# Patient Record
Sex: FEMALE | Race: BLACK OR AFRICAN AMERICAN | Employment: UNEMPLOYED | URBAN - METROPOLITAN AREA
[De-identification: names, ages, dates, MRNs, and addresses within clinical notes are randomized per-mention and may not be internally consistent; named-entity substitution may affect disease eponyms.]

---

## 2019-11-15 ENCOUNTER — HOSPITAL ENCOUNTER (OUTPATIENT)
Age: 29
Discharge: HOME | End: 2019-11-17

## 2019-11-15 PROCEDURE — 86762 RUBELLA ANTIBODY: CPT

## 2019-11-15 PROCEDURE — 86765 RUBEOLA ANTIBODY: CPT

## 2019-11-15 PROCEDURE — 86735 MUMPS ANTIBODY: CPT

## 2019-11-19 LAB
MEASLES IMMUNE (IGG): NORMAL
MUMPS AB IGG: NORMAL
RUBELLA ANTIBODY IGG: NORMAL

## 2020-08-31 ENCOUNTER — HOSPITAL ENCOUNTER (EMERGENCY)
Age: 30
Discharge: HOME | End: 2020-08-31
Attending: EMERGENCY MEDICINE
Payer: MEDICARE

## 2020-08-31 VITALS
HEART RATE: 70 BPM | BODY MASS INDEX: 28.43 KG/M2 | TEMPERATURE: 97.2 F | OXYGEN SATURATION: 98 % | DIASTOLIC BLOOD PRESSURE: 74 MMHG | RESPIRATION RATE: 17 BRPM | SYSTOLIC BLOOD PRESSURE: 122 MMHG | WEIGHT: 187 LBS

## 2020-08-31 LAB
ANION GAP SERPL CALCULATED.3IONS-SCNC: 12 MMOL/L (ref 7–16)
ANISOCYTOSIS: ABNORMAL
BACTERIA: ABNORMAL /HPF
BASOPHILS ABSOLUTE: 0.02 E9/L (ref 0–0.2)
BASOPHILS RELATIVE PERCENT: 0.6 % (ref 0–2)
BILIRUBIN URINE: NEGATIVE
BLOOD, URINE: NEGATIVE
BUN BLDV-MCNC: 6 MG/DL (ref 6–20)
CALCIUM SERPL-MCNC: 8.9 MG/DL (ref 8.6–10.2)
CHLORIDE BLD-SCNC: 106 MMOL/L (ref 98–107)
CLARITY: CLEAR
CO2: 21 MMOL/L (ref 22–29)
COLOR: YELLOW
CREAT SERPL-MCNC: 1 MG/DL (ref 0.5–1)
EOSINOPHILS ABSOLUTE: 0.02 E9/L (ref 0.05–0.5)
EOSINOPHILS RELATIVE PERCENT: 0.6 % (ref 0–6)
EPITHELIAL CELLS, UA: ABNORMAL /HPF
GFR AFRICAN AMERICAN: >60
GFR NON-AFRICAN AMERICAN: >60 ML/MIN/1.73
GLUCOSE BLD-MCNC: 68 MG/DL (ref 74–99)
GLUCOSE URINE: NEGATIVE MG/DL
HCG(URINE) PREGNANCY TEST: NEGATIVE
HCT VFR BLD CALC: 43.5 % (ref 34–48)
HEMOGLOBIN: 13.4 G/DL (ref 11.5–15.5)
IMMATURE GRANULOCYTES #: 0 E9/L
IMMATURE GRANULOCYTES %: 0 % (ref 0–5)
KETONES, URINE: NEGATIVE MG/DL
LACTIC ACID: 1.5 MMOL/L (ref 0.5–2.2)
LEUKOCYTE ESTERASE, URINE: ABNORMAL
LYMPHOCYTES ABSOLUTE: 1.69 E9/L (ref 1.5–4)
LYMPHOCYTES RELATIVE PERCENT: 46.8 % (ref 20–42)
MCH RBC QN AUTO: 27.6 PG (ref 26–35)
MCHC RBC AUTO-ENTMCNC: 30.8 % (ref 32–34.5)
MCV RBC AUTO: 89.7 FL (ref 80–99.9)
MONOCYTES ABSOLUTE: 0.32 E9/L (ref 0.1–0.95)
MONOCYTES RELATIVE PERCENT: 8.9 % (ref 2–12)
NEUTROPHILS ABSOLUTE: 1.56 E9/L (ref 1.8–7.3)
NEUTROPHILS RELATIVE PERCENT: 43.1 % (ref 43–80)
NITRITE, URINE: NEGATIVE
PDW BLD-RTO: 15.2 FL (ref 11.5–15)
PH UA: 6.5 (ref 5–9)
PLATELET # BLD: 155 E9/L (ref 130–450)
PMV BLD AUTO: 13 FL (ref 7–12)
POTASSIUM SERPL-SCNC: 3.8 MMOL/L (ref 3.5–5)
PROTEIN UA: NEGATIVE MG/DL
RBC # BLD: 4.85 E12/L (ref 3.5–5.5)
RBC UA: ABNORMAL /HPF (ref 0–2)
SODIUM BLD-SCNC: 139 MMOL/L (ref 132–146)
SPECIFIC GRAVITY UA: 1.02 (ref 1–1.03)
UROBILINOGEN, URINE: 0.2 E.U./DL
WBC # BLD: 3.6 E9/L (ref 4.5–11.5)
WBC UA: ABNORMAL /HPF (ref 0–5)

## 2020-08-31 PROCEDURE — 85025 COMPLETE CBC W/AUTO DIFF WBC: CPT

## 2020-08-31 PROCEDURE — 99284 EMERGENCY DEPT VISIT MOD MDM: CPT

## 2020-08-31 PROCEDURE — 99283 EMERGENCY DEPT VISIT LOW MDM: CPT

## 2020-08-31 PROCEDURE — 6370000000 HC RX 637 (ALT 250 FOR IP): Performed by: EMERGENCY MEDICINE

## 2020-08-31 PROCEDURE — 81025 URINE PREGNANCY TEST: CPT

## 2020-08-31 PROCEDURE — 83605 ASSAY OF LACTIC ACID: CPT

## 2020-08-31 PROCEDURE — 81001 URINALYSIS AUTO W/SCOPE: CPT

## 2020-08-31 PROCEDURE — 80048 BASIC METABOLIC PNL TOTAL CA: CPT

## 2020-08-31 RX ORDER — KETOROLAC TROMETHAMINE 30 MG/ML
60 INJECTION, SOLUTION INTRAMUSCULAR; INTRAVENOUS ONCE
Status: DISCONTINUED | OUTPATIENT
Start: 2020-08-31 | End: 2020-08-31

## 2020-08-31 RX ORDER — FLUCONAZOLE 150 MG/1
150 TABLET ORAL ONCE
Qty: 2 TABLET | Refills: 0 | Status: SHIPPED | OUTPATIENT
Start: 2020-08-31 | End: 2020-08-31

## 2020-08-31 RX ORDER — IBUPROFEN 800 MG/1
800 TABLET ORAL ONCE
Status: COMPLETED | OUTPATIENT
Start: 2020-08-31 | End: 2020-08-31

## 2020-08-31 RX ORDER — NAPROXEN 500 MG/1
500 TABLET ORAL 2 TIMES DAILY PRN
Qty: 60 TABLET | Refills: 0 | Status: SHIPPED | OUTPATIENT
Start: 2020-08-31 | End: 2021-11-03

## 2020-08-31 RX ORDER — CLOTRIMAZOLE 1 %
CREAM WITH APPLICATOR VAGINAL
Qty: 1 TUBE | Refills: 0 | Status: SHIPPED | OUTPATIENT
Start: 2020-08-31 | End: 2020-09-07

## 2020-08-31 RX ADMIN — IBUPROFEN 800 MG: 800 TABLET ORAL at 16:42

## 2020-08-31 ASSESSMENT — PAIN DESCRIPTION - ORIENTATION: ORIENTATION_2: LOWER

## 2020-08-31 ASSESSMENT — PAIN DESCRIPTION - DIRECTION: RADIATING_TOWARDS_2: WITH MOVEMENT

## 2020-08-31 ASSESSMENT — PAIN DESCRIPTION - DESCRIPTORS: DESCRIPTORS_2: DISCOMFORT

## 2020-08-31 ASSESSMENT — PAIN SCALES - GENERAL
PAINLEVEL_OUTOF10: 3
PAINLEVEL_OUTOF10: 10
PAINLEVEL_OUTOF10: 10

## 2020-08-31 ASSESSMENT — PAIN DESCRIPTION - LOCATION
LOCATION_2: BACK
LOCATION: ABDOMEN

## 2020-08-31 ASSESSMENT — PAIN DESCRIPTION - PAIN TYPE: TYPE: ACUTE PAIN

## 2020-08-31 ASSESSMENT — PAIN DESCRIPTION - INTENSITY: RATING_2: 10

## 2020-08-31 NOTE — ED PROVIDER NOTES
HPI:  8/31/20,   Time: 4:25 PM EDT       Portillo Mason is a 31 y.o. female presenting to the ED for back pain, beginning 1 week ago ago. The complaint has been intermittent, mild in severity, and worsened by nothing. Started on amox for dental abscess, then developed back pain after. No urinary sx, no n/v/d. Some vaginal dc/itching/lower abd pain. Nothing makes better. No hx same. No fever/chills/sweats/scough/congestoin/runny nose    Review of Systems:   Pertinent positives and negatives are stated within HPI, all other systems reviewed and are negative.          --------------------------------------------- PAST HISTORY ---------------------------------------------  Past Medical History:  has no past medical history on file. Past Surgical History:  has no past surgical history on file. Social History:  reports that she has never smoked. She has never used smokeless tobacco. She reports current alcohol use. She reports that she does not use drugs. Family History: family history is not on file. The patients home medications have been reviewed. Allergies: Patient has no known allergies. ---------------------------------------------------PHYSICAL EXAM--------------------------------------    Constitutional/General: Alert and oriented x3, well appearing, non toxic in NAD  Head: Normocephalic and atraumatic  Eyes: PERRL, EOMI, conjunctive normal, sclera non icteric  Mouth: Oropharynx clear, handling secretions, no trismus, no asymmetry of the posterior oropharynx or uvular edema  Neck: Supple, full ROM, non tender to palpation in the midline, no stridor, no crepitus, no meningeal signs  Respiratory: Lungs clear to auscultation bilaterally, no wheezes, rales, or rhonchi. Not in respiratory distress  Cardiovascular:  Regular rate. Regular rhythm. No murmurs, gallops, or rubs. 2+ distal pulses  Chest: No chest wall tenderness  GI:  Abdomen Soft, Non tender, Non distended. +BS.  No organomegaly, no palpable masses,  No rebound, guarding, or rigidity. Musculoskeletal: Moves all extremities x 4. Warm and well perfused, no clubbing, cyanosis, or edema. Capillary refill <3 seconds  Integument: skin warm and dry. No rashes. Lymphatic: no lymphadenopathy noted  Neurologic: GCS 15, no focal deficits, symmetric strength 5/5 in the upper and lower extremities bilaterally  Psychiatric: Normal Affect    -------------------------------------------------- RESULTS -------------------------------------------------  I have personally reviewed all laboratory and imaging results for this patient. Results are listed below.      LABS:  Results for orders placed or performed during the hospital encounter of 08/31/20   CBC WITH AUTO DIFFERENTIAL   Result Value Ref Range    WBC 3.6 (L) 4.5 - 11.5 E9/L    RBC 4.85 3.50 - 5.50 E12/L    Hemoglobin 13.4 11.5 - 15.5 g/dL    Hematocrit 43.5 34.0 - 48.0 %    MCV 89.7 80.0 - 99.9 fL    MCH 27.6 26.0 - 35.0 pg    MCHC 30.8 (L) 32.0 - 34.5 %    RDW 15.2 (H) 11.5 - 15.0 fL    Platelets 883 013 - 044 E9/L    MPV 13.0 (H) 7.0 - 12.0 fL    Neutrophils % 43.1 43.0 - 80.0 %    Immature Granulocytes % 0.0 0.0 - 5.0 %    Lymphocytes % 46.8 (H) 20.0 - 42.0 %    Monocytes % 8.9 2.0 - 12.0 %    Eosinophils % 0.6 0.0 - 6.0 %    Basophils % 0.6 0.0 - 2.0 %    Neutrophils Absolute 1.56 (L) 1.80 - 7.30 E9/L    Immature Granulocytes # 0.00 E9/L    Lymphocytes Absolute 1.69 1.50 - 4.00 E9/L    Monocytes Absolute 0.32 0.10 - 0.95 E9/L    Eosinophils Absolute 0.02 (L) 0.05 - 0.50 E9/L    Basophils Absolute 0.02 0.00 - 0.20 E9/L    Anisocytosis 1+    Basic metabolic panel   Result Value Ref Range    Sodium 139 132 - 146 mmol/L    Potassium 3.8 3.5 - 5.0 mmol/L    Chloride 106 98 - 107 mmol/L    CO2 21 (L) 22 - 29 mmol/L    Anion Gap 12 7 - 16 mmol/L    Glucose 68 (L) 74 - 99 mg/dL    BUN 6 6 - 20 mg/dL    CREATININE 1.0 0.5 - 1.0 mg/dL    GFR Non-African American >60 >=60 mL/min/1.73    GFR African American >60     Calcium 8.9 8.6 - 10.2 mg/dL   Urinalysis   Result Value Ref Range    Color, UA Yellow Straw/Yellow    Clarity, UA Clear Clear    Glucose, Ur Negative Negative mg/dL    Bilirubin Urine Negative Negative    Ketones, Urine Negative Negative mg/dL    Specific Gravity, UA 1.025 1.005 - 1.030    Blood, Urine Negative Negative    pH, UA 6.5 5.0 - 9.0    Protein, UA Negative Negative mg/dL    Urobilinogen, Urine 0.2 <2.0 E.U./dL    Nitrite, Urine Negative Negative    Leukocyte Esterase, Urine TRACE (A) Negative   LACTIC ACID, PLASMA   Result Value Ref Range    Lactic Acid 1.5 0.5 - 2.2 mmol/L   Microscopic Urinalysis   Result Value Ref Range    WBC, UA 1-3 0 - 5 /HPF    RBC, UA NONE 0 - 2 /HPF    Epithelial Cells, UA FEW /HPF    Bacteria, UA RARE (A) None Seen /HPF   Pregnancy, Urine   Result Value Ref Range    HCG(Urine) Pregnancy Test NEGATIVE NEGATIVE       RADIOLOGY:  Interpreted by Radiologist.  No orders to display       EKG: This EKG is signed and interpreted by the EP. Time:   Rate:   Rhythm:   Interpretation:   Comparison:       ------------------------- NURSING NOTES AND VITALS REVIEWED ---------------------------   The nursing notes within the ED encounter and vital signs as below have been reviewed by myself. /74   Pulse 70   Temp 97.2 °F (36.2 °C) (Temporal)   Resp 17   Wt 187 lb (84.8 kg)   LMP 07/25/2020   SpO2 98%   BMI 28.43 kg/m²   Oxygen Saturation Interpretation: Normal    The patients available past medical records and past encounters were reviewed. ------------------------------ ED COURSE/MEDICAL DECISION MAKING----------------------  Medications   ibuprofen (ADVIL;MOTRIN) tablet 800 mg (800 mg Oral Given 8/31/20 1642)         ED COURSE:       Medical Decision Making:    Exam benign, w/u noted, pt foul vaginal dc after abx, clinically yeast infection, will tx with antifungal and outpt fu.     Not torsion by hx, sx 1 weeks, no sign emergent cause of sx      This patient's ED course included: a personal history and physicial examination    This patient has remained hemodynamically stable during their ED course. Re-Evaluations:             Re-evaluation. Patients symptoms show no change          Counseling: The emergency provider has spoken with the patient and discussed todays results, in addition to providing specific details for the plan of care and counseling regarding the diagnosis and prognosis. Questions are answered at this time and they are agreeable with the plan.       --------------------------------- IMPRESSION AND DISPOSITION ---------------------------------    IMPRESSION  1. Lower abdominal pain    2. Acute low back pain without sciatica, unspecified back pain laterality        DISPOSITION  Disposition: Discharge to home  Patient condition is stable    NOTE: This report was transcribed using voice recognition software.  Every effort was made to ensure accuracy; however, inadvertent computerized transcription errors may be present        Roque Cueva MD  08/31/20 7731

## 2020-08-31 NOTE — ED NOTES
Bed: 23  Expected date:   Expected time:   Means of arrival:   Comments:  Antione Ya, SHAN  08/31/20 0170

## 2021-04-15 ENCOUNTER — TELEPHONE (OUTPATIENT)
Dept: SURGERY | Age: 31
End: 2021-04-15

## 2021-04-15 NOTE — TELEPHONE ENCOUNTER
Patient was referred by Dr. Jayla Sams for abscess. Patient was scheduled on 4/19/21 in [place] office @ 2:45 pm with Dr. Daniele Carvajal. Patient was instructed to bring a photo ID, insurance card (if applicable), and list of any current medications. Patient verbalized understanding of appointment instructions.           Electronically signed by Don Miller on 4/15/21 at 10:22 AM EDT

## 2021-04-26 ENCOUNTER — TELEPHONE (OUTPATIENT)
Dept: SURGERY | Age: 31
End: 2021-04-26

## 2021-04-26 NOTE — TELEPHONE ENCOUNTER
MA made second attempt to contact patient to reschedule missed appointment. Patient did not answer so MA left message requesting return call to schedule.      Electronically signed by Raisa Pinto on 4/26/21 at 10:22 AM EDT

## 2021-11-03 ENCOUNTER — OFFICE VISIT (OUTPATIENT)
Dept: FAMILY MEDICINE CLINIC | Age: 31
End: 2021-11-03
Payer: MEDICARE

## 2021-11-03 ENCOUNTER — HOSPITAL ENCOUNTER (OUTPATIENT)
Age: 31
Discharge: HOME | End: 2021-11-03
Payer: MEDICARE

## 2021-11-03 VITALS
HEIGHT: 68 IN | OXYGEN SATURATION: 100 % | TEMPERATURE: 98.1 F | DIASTOLIC BLOOD PRESSURE: 74 MMHG | BODY MASS INDEX: 28.01 KG/M2 | SYSTOLIC BLOOD PRESSURE: 111 MMHG | WEIGHT: 184.8 LBS | HEART RATE: 78 BPM

## 2021-11-03 DIAGNOSIS — Z13.1 SCREENING FOR DIABETES MELLITUS: ICD-10-CM

## 2021-11-03 DIAGNOSIS — Z13.89 SCREENING FOR BLOOD OR PROTEIN IN URINE: ICD-10-CM

## 2021-11-03 DIAGNOSIS — Z01.818 PRE-OP EVALUATION: ICD-10-CM

## 2021-11-03 DIAGNOSIS — Z01.818 PRE-OP EVALUATION: Primary | ICD-10-CM

## 2021-11-03 DIAGNOSIS — Z13.0 SCREENING FOR SICKLE-CELL DISEASE OR TRAIT: ICD-10-CM

## 2021-11-03 DIAGNOSIS — Z13.0 SCREENING FOR IRON DEFICIENCY ANEMIA: ICD-10-CM

## 2021-11-03 LAB
ALBUMIN SERPL-MCNC: 4.1 G/DL (ref 3.5–5.2)
ALP BLD-CCNC: 44 U/L (ref 35–104)
ALT SERPL-CCNC: 15 U/L (ref 0–32)
ANION GAP SERPL CALCULATED.3IONS-SCNC: 8 MMOL/L (ref 7–16)
APTT: 32.6 SEC (ref 24.5–35.1)
AST SERPL-CCNC: 23 U/L (ref 0–31)
BASOPHILS ABSOLUTE: 0.01 E9/L (ref 0–0.2)
BASOPHILS RELATIVE PERCENT: 0.3 % (ref 0–2)
BILIRUB SERPL-MCNC: 0.2 MG/DL (ref 0–1.2)
BILIRUBIN, POC: NORMAL
BLOOD URINE, POC: NORMAL
BUN BLDV-MCNC: 10 MG/DL (ref 6–20)
CALCIUM SERPL-MCNC: 9.1 MG/DL (ref 8.6–10.2)
CHLORIDE BLD-SCNC: 106 MMOL/L (ref 98–107)
CLARITY, POC: CLEAR
CO2: 26 MMOL/L (ref 22–29)
COLOR, POC: NORMAL
CREAT SERPL-MCNC: 0.9 MG/DL (ref 0.5–1)
EOSINOPHILS ABSOLUTE: 0.03 E9/L (ref 0.05–0.5)
EOSINOPHILS RELATIVE PERCENT: 0.8 % (ref 0–6)
GFR AFRICAN AMERICAN: >60
GFR NON-AFRICAN AMERICAN: >60 ML/MIN/1.73
GLUCOSE BLD-MCNC: 95 MG/DL (ref 74–99)
GLUCOSE URINE, POC: NORMAL
HCT VFR BLD CALC: 41.2 % (ref 34–48)
HEMOGLOBIN: 13.2 G/DL (ref 11.5–15.5)
IMMATURE GRANULOCYTES #: 0.01 E9/L
IMMATURE GRANULOCYTES %: 0.3 % (ref 0–5)
INR BLD: 1.1
KETONES, POC: NORMAL
LEUKOCYTE EST, POC: NORMAL
LYMPHOCYTES ABSOLUTE: 1.23 E9/L (ref 1.5–4)
LYMPHOCYTES RELATIVE PERCENT: 34.2 % (ref 20–42)
MCH RBC QN AUTO: 28.6 PG (ref 26–35)
MCHC RBC AUTO-ENTMCNC: 32 % (ref 32–34.5)
MCV RBC AUTO: 89.4 FL (ref 80–99.9)
MONOCYTES ABSOLUTE: 0.34 E9/L (ref 0.1–0.95)
MONOCYTES RELATIVE PERCENT: 9.4 % (ref 2–12)
NEUTROPHILS ABSOLUTE: 1.98 E9/L (ref 1.8–7.3)
NEUTROPHILS RELATIVE PERCENT: 55 % (ref 43–80)
NITRITE, POC: NORMAL
PDW BLD-RTO: 13.4 FL (ref 11.5–15)
PH, POC: 7
PLATELET # BLD: 157 E9/L (ref 130–450)
PMV BLD AUTO: 12.5 FL (ref 7–12)
POTASSIUM SERPL-SCNC: 4.3 MMOL/L (ref 3.5–5)
PROTEIN, POC: NORMAL
PROTHROMBIN TIME: 12.2 SEC (ref 9.3–12.4)
RBC # BLD: 4.61 E12/L (ref 3.5–5.5)
SODIUM BLD-SCNC: 140 MMOL/L (ref 132–146)
SPECIFIC GRAVITY, POC: 1.02
TOTAL PROTEIN: 7.4 G/DL (ref 6.4–8.3)
UROBILINOGEN, POC: 1
WBC # BLD: 3.6 E9/L (ref 4.5–11.5)

## 2021-11-03 PROCEDURE — G8484 FLU IMMUNIZE NO ADMIN: HCPCS | Performed by: FAMILY MEDICINE

## 2021-11-03 PROCEDURE — 99203 OFFICE O/P NEW LOW 30 MIN: CPT | Performed by: FAMILY MEDICINE

## 2021-11-03 PROCEDURE — G8427 DOCREV CUR MEDS BY ELIG CLIN: HCPCS | Performed by: FAMILY MEDICINE

## 2021-11-03 PROCEDURE — G8419 CALC BMI OUT NRM PARAM NOF/U: HCPCS | Performed by: FAMILY MEDICINE

## 2021-11-03 PROCEDURE — 85660 RBC SICKLE CELL TEST: CPT

## 2021-11-03 PROCEDURE — 80053 COMPREHEN METABOLIC PANEL: CPT

## 2021-11-03 PROCEDURE — 85730 THROMBOPLASTIN TIME PARTIAL: CPT

## 2021-11-03 PROCEDURE — 85025 COMPLETE CBC W/AUTO DIFF WBC: CPT

## 2021-11-03 PROCEDURE — 85610 PROTHROMBIN TIME: CPT

## 2021-11-03 PROCEDURE — 1036F TOBACCO NON-USER: CPT | Performed by: FAMILY MEDICINE

## 2021-11-03 PROCEDURE — 36415 COLL VENOUS BLD VENIPUNCTURE: CPT

## 2021-11-03 ASSESSMENT — PATIENT HEALTH QUESTIONNAIRE - PHQ9
SUM OF ALL RESPONSES TO PHQ QUESTIONS 1-9: 0
SUM OF ALL RESPONSES TO PHQ QUESTIONS 1-9: 0
SUM OF ALL RESPONSES TO PHQ9 QUESTIONS 1 & 2: 0
1. LITTLE INTEREST OR PLEASURE IN DOING THINGS: 0
2. FEELING DOWN, DEPRESSED OR HOPELESS: 0
SUM OF ALL RESPONSES TO PHQ QUESTIONS 1-9: 0

## 2021-11-03 NOTE — PROGRESS NOTES
S: 36 y.o. female with   Chief Complaint   Patient presents with    Pre-op Exam     liposuction with transfer to buttocks        Pre-op eval - planning cosmetic surgery - lipo    O: VS:  height is 5' 8\" (1.727 m) and weight is 184 lb 12.8 oz (83.8 kg). Her temporal temperature is 98.1 °F (36.7 °C). Her blood pressure is 111/74 and her pulse is 78. Her oxygen saturation is 100%. BP Readings from Last 3 Encounters:   11/03/21 111/74   08/31/20 122/74   11/04/10 104/70     See resident note      Impression/Plan:   1) Pre-Op Liposuction surgery - she is aware of surgical risks and recovery that is anticipated. Low Risk for proposed surgery. Testing as needed by the surgeon. Health Maintenance Due   Topic Date Due    COVID-19 Vaccine (1) Never done    Varicella vaccine (2 of 2 - 13+ 2-dose series) 03/05/2010    Pap smear  08/07/2016    Flu vaccine (1) Never done         Attending Physician Statement  I have discussed the case, including pertinent history and exam findings with the resident. I agree with the documented assessment and plan.       Alexander Herrera MD

## 2021-11-03 NOTE — PROGRESS NOTES
[facility]  Department of Family Medicine  Family Medicine Residency Program      Patient:  Linus Guevara 36 y.o. female  Date of Service: 11/3/21      Chief complaint:   Chief Complaint   Patient presents with    Pre-op Exam     liposuction with transfer to buttocks          History ofPresent Illness   Linus Guevara is a 36 y.o. female who presents to the clinic with complaints as above. Pre Op  Patient is planning to have a voluntary procedure done in [place] with Dr. Chantell Ba  She is scheduled for a Liposuction 360 with a Evens on November 23rd 2021  She has met with the surgeon and is going to [place] with her mother  She understands the recovery and risks  She denies any past medical, surgical history, no family history of bleeding disorders or issues with anesthesia  She denies being forced to do this and states she feels safe at home  She is comfortable with the procedure  She states she feels well    Past Medical History:  History reviewed. No pertinent past medical history. Past Surgical History:    History reviewed. No pertinent surgical history. Allergies:    Patient has no known allergies.     Social History:   Social History     Socioeconomic History    Marital status: Single     Spouse name: Not on file    Number of children: Not on file    Years of education: Not on file    Highest education level: Not on file   Occupational History    Not on file   Tobacco Use    Smoking status: Never Smoker    Smokeless tobacco: Never Used   Substance and Sexual Activity    Alcohol use: Yes     Comment: socially    Drug use: No    Sexual activity: Yes     Partners: Male   Other Topics Concern    Not on file   Social History Narrative    Not on file     Social Determinants of Health     Financial Resource Strain:     Difficulty of Paying Living Expenses:    Food Insecurity:     Worried About Running Out of Food in the Last Year:     Damian gonzalez Food in the Last Year:    Transportation Needs:     Lack of Transportation (Medical):  Lack of Transportation (Non-Medical):    Physical Activity:     Days of Exercise per Week:     Minutes of Exercise per Session:    Stress:     Feeling of Stress :    Social Connections:     Frequency of Communication with Friends and Family:     Frequency of Social Gatherings with Friends and Family:     Attends Congregation Services:     Active Member of Clubs or Organizations:     Attends Club or Organization Meetings:     Marital Status:    Intimate Partner Violence:     Fear of Current or Ex-Partner:     Emotionally Abused:     Physically Abused:     Sexually Abused:         Family History:   History reviewed. No pertinent family history. Medication List:    No current outpatient medications on file. No current facility-administered medications for this visit. Review of Systems:   Review of Systems as per HPI    Physical Exam   Vitals: /74   Pulse 78   Temp 98.1 °F (36.7 °C) (Temporal)   Ht 5' 8\" (1.727 m)   Wt 184 lb 12.8 oz (83.8 kg)   LMP 11/03/2021   SpO2 100%   BMI 28.10 kg/m²   Physical Exam  Vitals and nursing note reviewed. Constitutional:       General: She is not in acute distress. Appearance: Normal appearance. She is not ill-appearing. HENT:      Head: Normocephalic and atraumatic. Eyes:      General:         Right eye: No discharge. Left eye: No discharge. Cardiovascular:      Rate and Rhythm: Normal rate and regular rhythm. Heart sounds: No murmur heard. Pulmonary:      Effort: Pulmonary effort is normal.      Breath sounds: Normal breath sounds. No wheezing. Abdominal:      General: Bowel sounds are normal.      Palpations: Abdomen is soft. Tenderness: There is no abdominal tenderness. Musculoskeletal:         General: Normal range of motion. Cervical back: Normal range of motion. No rigidity. Right lower leg: No edema. Left lower leg: No edema. Skin:     General: Skin is warm and dry. Findings: No lesion or rash. Neurological:      Mental Status: She is alert and oriented to person, place, and time. Mental status is at baseline. Motor: No weakness. Psychiatric:         Mood and Affect: Mood normal.         Behavior: Behavior normal.         Thought Content: Thought content normal.         Judgment: Judgment normal.         Assessment and Plan     1. Pre-op evaluation  Liposuction with BBL in [place] with Dr. Neo Moreno  Patient understands risks  Pre-op labs as below, requested by Dr. Neo Moreno  FU 2 months, after procedure  - CBC WITH AUTO DIFFERENTIAL; Future  - COMPREHENSIVE METABOLIC PANEL; Future  - SICKLE CELL SCREEN; Future  - APTT; Future  - PROTIME-INR; Future  - POCT Urinalysis no Micro    2. Screening for sickle-cell disease or trait  - CBC WITH AUTO DIFFERENTIAL; Future  - SICKLE CELL SCREEN; Future  - APTT; Future  - PROTIME-INR; Future    3. Screening for blood or protein in urine  Patient is on her period  UA showed moderate blood, expected given she is menstruating  - POCT Urinalysis no Micro    4. Screening for iron deficiency anemia  - CBC WITH AUTO DIFFERENTIAL; Future    5. Screening for diabetes mellitus  - COMPREHENSIVE METABOLIC PANEL; Future      Counseled regarding above diagnosis, including possible risks and complications, especially if left uncontrolled. Counseled regarding the possible side effects, risks, benefits and alternatives to treatment; patient and/or guardian verbalizes understanding, agrees, feels comfortable with, and wishes to proceed with above treatment plan. Call or go to ED immediately if symptoms worsen or persist. Advised patient to call with any new medication issues and, as applicable, read all Rx info from pharmacy to assure aware of all possible risks and side effects of medication before taking.     Patient and/or guardian given opportunity to ask questions/raise concerns. The patient verbalized comfort and understanding of instructions. I encourage further reading and education about your health conditions. Information on many health conditions is provided by the [employer]: https://familydoctor. org/  Please bring any questions to me at your next visit. Return to Office: Return in about 2 months (around 1/3/2022) for FU surgery, pap or annual well.     Eva May, DO

## 2021-11-04 ENCOUNTER — TELEPHONE (OUTPATIENT)
Dept: FAMILY MEDICINE CLINIC | Age: 31
End: 2021-11-04

## 2021-11-04 LAB — SICKLE CELL SCREEN: NEGATIVE

## 2021-11-04 NOTE — TELEPHONE ENCOUNTER
Faxed information to [id number] [facility] @ [phone]. If no answer email to [email or web address]